# Patient Record
Sex: FEMALE | Race: OTHER | HISPANIC OR LATINO | URBAN - METROPOLITAN AREA
[De-identification: names, ages, dates, MRNs, and addresses within clinical notes are randomized per-mention and may not be internally consistent; named-entity substitution may affect disease eponyms.]

---

## 2023-09-01 ENCOUNTER — APPOINTMENT (RX ONLY)
Dept: URBAN - METROPOLITAN AREA CLINIC 84 | Facility: CLINIC | Age: 23
Setting detail: DERMATOLOGY
End: 2023-09-01

## 2023-09-01 DIAGNOSIS — L259 CONTACT DERMATITIS AND OTHER ECZEMA, UNSPECIFIED CAUSE: ICD-10-CM

## 2023-09-01 DIAGNOSIS — L73.2 HIDRADENITIS SUPPURATIVA: ICD-10-CM | Status: INADEQUATELY CONTROLLED

## 2023-09-01 PROBLEM — L23.9 ALLERGIC CONTACT DERMATITIS, UNSPECIFIED CAUSE: Status: ACTIVE | Noted: 2023-09-01

## 2023-09-01 PROCEDURE — 99203 OFFICE O/P NEW LOW 30 MIN: CPT

## 2023-09-01 PROCEDURE — ? COUNSELING

## 2023-09-01 PROCEDURE — ? MEDICATION COUNSELING

## 2023-09-01 PROCEDURE — ? PRESCRIPTION

## 2023-09-01 PROCEDURE — ? PRESCRIPTION MEDICATION MANAGEMENT

## 2023-09-01 RX ORDER — DESONIDE 0.5 MG/G
CREAM TOPICAL BID
Qty: 15 | Refills: 1 | Status: ERX | COMMUNITY
Start: 2023-09-01

## 2023-09-01 RX ORDER — CLINDAMYCIN PHOSPHATE 10 MG/ML
SOLUTION TOPICAL QD
Qty: 60 | Refills: 6 | Status: ERX | COMMUNITY
Start: 2023-09-01

## 2023-09-01 RX ORDER — DOXYCYCLINE HYCLATE 100 MG/1
CAPSULE, GELATIN COATED ORAL QD
Qty: 20 | Refills: 0 | Status: ERX | COMMUNITY
Start: 2023-09-01

## 2023-09-01 RX ADMIN — CLINDAMYCIN PHOSPHATE: 10 SOLUTION TOPICAL at 00:00

## 2023-09-01 RX ADMIN — DESONIDE: 0.5 CREAM TOPICAL at 00:00

## 2023-09-01 RX ADMIN — DOXYCYCLINE HYCLATE: 100 CAPSULE, GELATIN COATED ORAL at 00:00

## 2023-09-01 ASSESSMENT — LOCATION SIMPLE DESCRIPTION DERM
LOCATION SIMPLE: GROIN
LOCATION SIMPLE: LEFT BREAST
LOCATION SIMPLE: RIGHT BREAST
LOCATION SIMPLE: LEFT AXILLARY VAULT

## 2023-09-01 ASSESSMENT — LOCATION DETAILED DESCRIPTION DERM
LOCATION DETAILED: LEFT INGUINAL CREASE
LOCATION DETAILED: LEFT INFRAMAMMARY CREASE (INNER QUADRANT)
LOCATION DETAILED: LEFT AXILLARY VAULT
LOCATION DETAILED: RIGHT INFRAMAMMARY CREASE (INNER QUADRANT)

## 2023-09-01 ASSESSMENT — LOCATION ZONE DERM
LOCATION ZONE: TRUNK
LOCATION ZONE: AXILLAE

## 2023-09-01 NOTE — PROCEDURE: MEDICATION COUNSELING
Xelmurrayz Pregnancy And Lactation Text: This medication is Pregnancy Category D and is not considered safe during pregnancy.  The risk during breast feeding is also uncertain.

## 2023-09-01 NOTE — PROCEDURE: PRESCRIPTION MEDICATION MANAGEMENT
Detail Level: Zone
Render In Strict Bullet Format?: No
Initiate Treatment: Topical clindamycin, doxycycline
Plan: Follow up in 2-4 weeks
Initiate Treatment: Desonide
Plan: Follow up in 2 weeks

## 2024-11-04 NOTE — PROCEDURE: MEDICATION COUNSELING
Patient is a 60y Female seen on consultation for the evaluation and management of Adenopathy.  Dr. Thurston called me yesterday afternoon to see this patient while admitted for CHF exacerbation.  Pt has hx of DM, on insulin pump, HTN, Hypothyroidism; admitted with worsening dyspnea, leg swelling, cough.  Found to have hypoxia, leg edema, vasc congestion.  Seen by Cardiology.  Echo shows 55% EF, echogenic mass 1.8 by 2 cm RA.  CT chest done as outpat showed mod sized bilat plerual effusion, enlarged mediastional, hilar, axillary and subpectoral lymph nodes (11/1/24)  Pt feels less dyspneic after treatmen.  Reports poor appetite.  Denies chest pain or palpitations fevers, chills or sweats,  abd pain or vomiting.      PAST MEDICAL & SURGICAL HISTORY:  Diabetes  Hypertension  No significant past surgical history    Allergies  No Known Allergies    REVIEW OF SYSTEMS  Reports dyspnea, legs swelling, cough  Denies headaches or dizziness  Reports nausea, no vomiting  Denies rectal bleeding or melena, hematemesis or hemoptysis dysuria or hematruia      MEDICATIONS  (STANDING):  amLODIPine   Tablet 5 milliGRAM(s) Oral daily  dextrose 5%. 1000 milliLiter(s) (100 mL/Hr) IV Continuous <Continuous>  dextrose 5%. 1000 milliLiter(s) (50 mL/Hr) IV Continuous <Continuous>  dextrose 50% Injectable 25 Gram(s) IV Push once  dextrose 50% Injectable 12.5 Gram(s) IV Push once  dextrose 50% Injectable 25 Gram(s) IV Push once  furosemide   Injectable 60 milliGRAM(s) IV Push two times a day  gabapentin 300 milliGRAM(s) Oral two times a day  glucagon  Injectable 1 milliGRAM(s) IntraMuscular once  heparin   Injectable 5000 Unit(s) SubCutaneous every 8 hours  influenza   Vaccine 0.5 milliLiter(s) IntraMuscular once  levothyroxine 125 MICROGram(s) Oral daily    MEDICATIONS  (PRN):  acetaminophen     Tablet .. 650 milliGRAM(s) Oral every 6 hours PRN Temp greater or equal to 38C (100.4F), Mild Pain (1 - 3)  aluminum hydroxide/magnesium hydroxide/simethicone Suspension 30 milliLiter(s) Oral every 4 hours PRN Dyspepsia  dextrose Oral Gel 15 Gram(s) Oral once PRN Blood Glucose LESS THAN 70 milliGRAM(s)/deciliter  melatonin 3 milliGRAM(s) Oral at bedtime PRN Insomnia  ondansetron Injectable 4 milliGRAM(s) IV Push every 8 hours PRN Nausea and/or Vomiting      Vital Signs Last 24 Hrs  T(C): 36.7 (04 Nov 2024 07:24), Max: 36.9 (03 Nov 2024 11:10)  T(F): 98 (04 Nov 2024 07:24), Max: 98.4 (03 Nov 2024 11:10)  HR: 97 (04 Nov 2024 07:24) (89 - 102)  BP: 119/76 (04 Nov 2024 07:24) (117/73 - 147/83)  BP(mean): --  RR: 18 (04 Nov 2024 07:24) (18 - 18)  SpO2: 96% (04 Nov 2024 07:24) (93% - 96%)    Parameters below as of 04 Nov 2024 07:24  Patient On (Oxygen Delivery Method): nasal cannula      PHYSICAL EXAM:  Constitutional:WD over weight, NAD  No adenopathy cervical or axillary appreciated  Anicteric  Lungs with decreased BS at bases  Cor RRR normal S1S2  Abd:  Soft, N-T normoactive BS  Extrem with trace bilateral edema        CBC                          12.1   5.59  )-----------( 309      ( 04 Nov 2024 05:49 )             37.2                           13.0   6.12  )-----------( 338      ( 03 Nov 2024 07:21 )             38.8     CHEM  11-04    136  |  100  |  39.9[H]  ----------------------------<  192[H]  4.5   |  20.0[L]  |  3.27[H]    Ca    8.0[L]      04 Nov 2024 05:49  Phos  4.2     11-03  Mg     1.8     11-03    TPro  6.0[L]  /  Alb  2.7[L]  /  TBili  0.3[L]  /  DBili  x   /  AST  10  /  ALT  5   /  AlkPhos  99  11-03 11-03    135  |  99  |  31.7[H]  ----------------------------<  82  3.9   |  23.0  |  2.82[H]    Ca    8.1[L]      03 Nov 2024 07:21  Phos  4.2     11-03  Mg     1.8     11-03    TPro  6.0[L]  /  Alb  2.7[L]  /  TBili  0.3[L]  /  DBili  x   /  AST  10  /  ALT  5   /  AlkPhos  99  11-03      Urinalysis Basic - ( 03 Nov 2024 07:21 )    Color: x / Appearance: x / SG: x / pH: x  Gluc: 82 mg/dL / Ketone: x  / Bili: x / Urobili: x   Blood: x / Protein: x / Nitrite: x   Leuk Esterase: x / RBC: x / WBC x   Sq Epi: x / Non Sq Epi: x / Bacteria: x        RADIOLOGY & ADDITIONAL STUDIES: Siliq Pregnancy And Lactation Text: The risk during pregnancy and breastfeeding is uncertain with this medication.
